# Patient Record
Sex: FEMALE | Race: BLACK OR AFRICAN AMERICAN | Employment: FULL TIME | URBAN - METROPOLITAN AREA
[De-identification: names, ages, dates, MRNs, and addresses within clinical notes are randomized per-mention and may not be internally consistent; named-entity substitution may affect disease eponyms.]

---

## 2018-12-26 ENCOUNTER — OFFICE VISIT (OUTPATIENT)
Dept: FAMILY MEDICINE CLINIC | Age: 38
End: 2018-12-26

## 2018-12-26 VITALS
HEART RATE: 95 BPM | OXYGEN SATURATION: 99 % | BODY MASS INDEX: 37.03 KG/M2 | TEMPERATURE: 98 F | HEIGHT: 63 IN | DIASTOLIC BLOOD PRESSURE: 61 MMHG | WEIGHT: 209 LBS | SYSTOLIC BLOOD PRESSURE: 115 MMHG | RESPIRATION RATE: 20 BRPM

## 2018-12-26 DIAGNOSIS — Z13.220 ENCOUNTER FOR LIPID SCREENING FOR CARDIOVASCULAR DISEASE: ICD-10-CM

## 2018-12-26 DIAGNOSIS — Z11.59 SCREENING FOR VIRAL DISEASE: ICD-10-CM

## 2018-12-26 DIAGNOSIS — Z13.228 SCREENING FOR ENDOCRINE, METABOLIC, AND IMMUNITY DISORDER: ICD-10-CM

## 2018-12-26 DIAGNOSIS — Z11.2 SCREENING EXAMINATION FOR BACTERIAL AND SPIROCHETAL DISEASE: ICD-10-CM

## 2018-12-26 DIAGNOSIS — Z11.4 SCREENING FOR HIV (HUMAN IMMUNODEFICIENCY VIRUS): ICD-10-CM

## 2018-12-26 DIAGNOSIS — Z11.3 SCREEN FOR STD (SEXUALLY TRANSMITTED DISEASE): ICD-10-CM

## 2018-12-26 DIAGNOSIS — Z13.29 SCREENING FOR ENDOCRINE, METABOLIC, AND IMMUNITY DISORDER: ICD-10-CM

## 2018-12-26 DIAGNOSIS — Z13.0 SCREENING FOR ENDOCRINE, METABOLIC, AND IMMUNITY DISORDER: ICD-10-CM

## 2018-12-26 DIAGNOSIS — Z00.00 ROUTINE GENERAL MEDICAL EXAMINATION AT A HEALTH CARE FACILITY: Primary | ICD-10-CM

## 2018-12-26 DIAGNOSIS — Z11.8 SCREENING EXAMINATION FOR BACTERIAL AND SPIROCHETAL DISEASE: ICD-10-CM

## 2018-12-26 DIAGNOSIS — R11.2 NAUSEA AND VOMITING, INTRACTABILITY OF VOMITING NOT SPECIFIED, UNSPECIFIED VOMITING TYPE: ICD-10-CM

## 2018-12-26 DIAGNOSIS — Z30.011 OCP (ORAL CONTRACEPTIVE PILLS) INITIATION: ICD-10-CM

## 2018-12-26 DIAGNOSIS — N94.6 DYSMENORRHEA: ICD-10-CM

## 2018-12-26 DIAGNOSIS — Z13.6 ENCOUNTER FOR LIPID SCREENING FOR CARDIOVASCULAR DISEASE: ICD-10-CM

## 2018-12-26 DIAGNOSIS — Z01.818 TUBAL LIGATION EVALUATION: ICD-10-CM

## 2018-12-26 DIAGNOSIS — Z13.1 SCREENING FOR DIABETES MELLITUS: ICD-10-CM

## 2018-12-26 LAB
HCG URINE, QL. (POC): NEGATIVE
VALID INTERNAL CONTROL?: YES

## 2018-12-26 NOTE — PROGRESS NOTES
OFFICE NOTE    Leeanne Young is a 45 y.o. female presenting today for office visit. 12/26/2018  5:42 PM    Chief Complaint   Patient presents with   [address] Patient    Abdominal Pain     discomfort in abd since last night    Vomiting     x 3       HPI: Here today as a new patient, establishing care in office. No recent labs, no recent PCP. Has seen GYN in the past- Dr Shawnee Blackman- about 2 years ago. Reports that she would like to discuss contraceptive options including tubal ligation, as well as management of menstrual symptoms. She is sexually active with male partner and has two children- youngest is two years old. Previously on Loestrin which worked well. Has painful periods and heavy bleeding including clots. States that since her last child, her periods have gotten worse. Miscarriage last year around this time. Mentions that she had some abdominal discomfort generalized yesterday and through the night and had some vomiting yesterday as well. Feels that maybe it was something she ate over the holidays- no symptoms today other than feeling a little sore in her abdominal muscles. No fevers, chills, urinary symptoms, etc.       Review of Systems   Constitutional: Negative for chills, fatigue and fever. Respiratory: Negative for cough, shortness of breath and wheezing. Cardiovascular: Negative for chest pain, palpitations and leg swelling. Gastrointestinal: Positive for abdominal pain, nausea and vomiting. Negative for constipation and diarrhea. Genitourinary: Positive for menstrual problem. Negative for difficulty urinating, frequency, pelvic pain and urgency. Musculoskeletal: Negative for arthralgias and myalgias. Skin: Negative for rash. Neurological: Negative for dizziness and headaches.          PHQ Screening   PHQ over the last two weeks 12/26/2018   Little interest or pleasure in doing things Not at all   Feeling down, depressed, irritable, or hopeless Not at all   Total Score PHQ 2 0         History  Past Medical History:   Diagnosis Date    GERD (gastroesophageal reflux disease)        Past Surgical History:   Procedure Laterality Date    HX CHOLECYSTECTOMY         Social History     Socioeconomic History    Marital status: SINGLE     Spouse name: Not on file    Number of children: Not on file    Years of education: Not on file    Highest education level: Not on file   Social Needs    Financial resource strain: Not on file    Food insecurity - worry: Not on file    Food insecurity - inability: Not on file   [employer] needs - medical: Not on file   [employer] needs - non-medical: Not on file   Occupational History    Not on file   Tobacco Use    Smoking status: Former Smoker     Types: Cigarettes     Last attempt to quit: 10/2018     Years since quittin.2    Smokeless tobacco: Never Used   Substance and Sexual Activity    Alcohol use: No    Drug use: No    Sexual activity: Yes     Partners: Male     Birth control/protection: Rhythm   Other Topics Concern    Not on file   Social History Narrative    Not on file       Family History   Problem Relation Age of Onset    Hypertension Mother     Hypertension Father     Alcohol abuse Father     Heart Disease Father     Substance Abuse Father     Diabetes Sister     Hypertension Sister     Obesity Sister     Depression Sister     No Known Problems Brother     No Known Problems Brother        No Known Allergies      No current daily medications      Advance Care Planning:   Patient was offered the opportunity to discuss advance care planning NO   Does patient have an Advance Directive: If no, did you provide information on [facility]?          Patient Care Team:  Patient Care Team:  Radha Roth NP as PCP - General (Nurse Practitioner)        LABS:  Results for orders placed or performed in visit on 18   AMB POC URINE PREGNANCY TEST, VISUAL COLOR COMPARISON   Result Value Ref Range    VALID INTERNAL CONTROL POC Yes     HCG urine, Ql. (POC) Negative Negative       RADIOLOGY:  None new to review      Physical Exam   Constitutional: She is oriented to person, place, and time. She appears well-developed and well-nourished. No distress. Neck: Normal range of motion. Neck supple. No thyromegaly present. Cardiovascular: Normal rate, regular rhythm and normal heart sounds. No murmur heard. Pulmonary/Chest: Effort normal and breath sounds normal. No respiratory distress. Abdominal: Soft. Bowel sounds are normal. There is no tenderness. Musculoskeletal: She exhibits no edema. Neurological: She is alert and oriented to person, place, and time. She exhibits normal muscle tone. Coordination and gait normal.   Skin: Skin is warm and dry. Vitals:    12/26/18 1729   BP: 115/61   Pulse: 95   Resp: 20   Temp: 98 °F (36.7 °C)   TempSrc: Oral   SpO2: 99%   Weight: 209 lb (94.8 kg)   Height: 5' 3\" (1.6 m)   PainSc:   2   PainLoc: Abdomen   LMP: 12/10/2018         Assessment and Plan    Routine general medical examination at a health care facility  *CPE completed today. Routine labs ordered. OCP (oral contraceptive pills) initiation/ Dysmenorrhea  *Will start on OCP- reports that she has smoked socially in the past but will not be using with OCP- understands risks associated with age, smoking, and OCP. Advised to start OCP the Sunday after her next cycle. - norethindrone-e estradiol-iron (LOESTRIN FE) 1 mg-20 mcg (24)/75 mg (4) tab; Take 1 Tab by mouth daily. Dispense: 3 Package; Refill: 3  - AMB POC URINE PREGNANCY TEST, VISUAL COLOR COMPARISON    Tubal ligation evaluation  - REFERRAL TO OBSTETRICS AND GYNECOLOGY    Nausea and vomiting, intractability of vomiting not specified, unspecified vomiting type  *Improved today. Advised on hydration and soft diet. RTO if worsening or new symptoms.     Encounter for lipid screening for cardiovascular disease  - LIPID PANEL; Future    Screening for diabetes mellitus  - METABOLIC PANEL, COMPREHENSIVE; Future    Screening for endocrine, metabolic, and immunity disorder  - CBC W/O DIFF; Future  - METABOLIC PANEL, COMPREHENSIVE; Future  - TSH 3RD GENERATION; Future  - URINALYSIS W/MICROSCOPIC; Future    Screening for viral disease/ Screening examination for bacterial and spirochetal disease/ Screening for HIV (human immunodeficiency virus)/ Screen for STD (sexually transmitted disease)  - RPR; Future  - HEPATITIS C AB; Future  - HEP B SURFACE AG; Future  - HIV 1/2 AG/AB, 4TH GENERATION,W RFLX CONFIRM; Future          *Plan of care reviewed with patient. Patient in agreement with plan and expresses understanding. All questions answered and patient encouraged to call or RTO if further questions or concerns. Follow-up Disposition:  Return in about 1 year (around 12/26/2019) for complete physical- 30 mins. Sooner if needed. Jia Stewart

## 2018-12-26 NOTE — PROGRESS NOTES
Chief Complaint   Patient presents with    Establish Care     New Patient    Abdominal Pain     discomfort in abd since last night    Vomiting     x 3       1. Have you been to the ER, urgent care clinic since your last visit? Hospitalized since your last visit? No    2. Have you seen or consulted any other health care providers outside of the [address] [place] [address] since your last visit? Include any pap smears or colon screening.

## 2019-01-02 ENCOUNTER — LAB ONLY (OUTPATIENT)
Dept: FAMILY MEDICINE CLINIC | Age: 39
End: 2019-01-02

## 2019-01-02 NOTE — PROGRESS NOTES
Patient came in for lab draw only. No specimen obtained after two attempts. Patient will hydrate herself and return on Friday for fasting labs.

## 2020-10-06 ENCOUNTER — VIRTUAL VISIT (OUTPATIENT)
Dept: FAMILY MEDICINE CLINIC | Age: 40
End: 2020-10-06

## 2020-10-07 ENCOUNTER — VIRTUAL VISIT (OUTPATIENT)
Dept: FAMILY MEDICINE CLINIC | Age: 40
End: 2020-10-07
Payer: COMMERCIAL

## 2020-10-07 DIAGNOSIS — Z13.228 SCREENING FOR ENDOCRINE, METABOLIC AND IMMUNITY DISORDER: ICD-10-CM

## 2020-10-07 DIAGNOSIS — M54.50 CHRONIC RIGHT-SIDED LOW BACK PAIN WITHOUT SCIATICA: Primary | ICD-10-CM

## 2020-10-07 DIAGNOSIS — G89.29 CHRONIC RIGHT-SIDED LOW BACK PAIN WITHOUT SCIATICA: Primary | ICD-10-CM

## 2020-10-07 DIAGNOSIS — Z13.0 SCREENING FOR ENDOCRINE, METABOLIC AND IMMUNITY DISORDER: ICD-10-CM

## 2020-10-07 DIAGNOSIS — Z13.29 SCREENING FOR ENDOCRINE, METABOLIC AND IMMUNITY DISORDER: ICD-10-CM

## 2020-10-07 DIAGNOSIS — Z76.89 ENCOUNTER TO ESTABLISH CARE WITH NEW DOCTOR: ICD-10-CM

## 2020-10-07 PROCEDURE — 99213 OFFICE O/P EST LOW 20 MIN: CPT | Performed by: NURSE PRACTITIONER

## 2020-10-07 NOTE — PROGRESS NOTES
Srinivas Tierney is a 36 y.o. female who was seen by synchronous (real-time) audio-video technology on 10/7/2020 for No chief complaint on file. Assessment & Plan:   Diagnoses and all orders for this visit:    1. Chronic right-sided low back pain without sciatica    2. Encounter to establish care with new doctor    3. Screening for endocrine, metabolic and immunity disorder  -     LIPID PANEL; Future  -     METABOLIC PANEL, COMPREHENSIVE; Future  -     CBC W/O DIFF; Future  -     TSH 3RD GENERATION; Future  -     HEMOGLOBIN A1C WITH EAG; Future  -     URINALYSIS W/MICROSCOPIC; Future    Advised patient that she needs to get lab work to further work her up and will review on next visit. I spent at least 15 minutes on this visit with this established patient. 712  Subjective:     Patient was previous PCP was Rosalina Rasmussen. She was last seen 12/26/2018. Patient have no recent lab work done    Patient had a IUD placed July 2020 and never went back to follow up to be check for placement. IUD was placed by Justin Ni at OB[facility]N. Patient does not have a concern or complications with at this time. Patient is having normal periods. However patient have not overall been feeling well and unable to really explain the feeling. She ate a piece a steak one day and vomited only that one day. She sometime have nausea and not sure where it comes from but have had GERD in the past. She if symptoms are related to IUD. Back Pain - patient states she intermittently have right lower back pain that happen when she sit for long periods of time or when she lay on her right side within the last 2 months. Pain does not radiate and she does not take anything for the discomfort. Patient states she had gallbladder surgery in 2013. She had GERD and took omeprazole for 2 years. Patient is concerned if her kidneys got infected from taking the omeprazole and causing her back pain.         Prior to Admission medications Medication Sig Start Date End Date Taking? Authorizing Provider   norethindrone-e estradiol-iron (LOESTRIN FE) 1 mg-20 mcg (24)/75 mg (4) tab Take 1 Tab by mouth daily. 12/26/18   Facundo Ngo NP     There is no problem list on file for this patient. No Known Allergies  Family History   Problem Relation Age of Onset    Hypertension Mother     Hypertension Father     Alcohol abuse Father     Heart Disease Father     Substance Abuse Father     Diabetes Sister     Hypertension Sister     Obesity Sister     Depression Sister     No Known Problems Brother     No Known Problems Brother        Review of Systems   Constitutional: Negative. HENT: Negative. Eyes: Negative. Respiratory: Negative. Cardiovascular: Negative for chest pain, palpitations and leg swelling. Gastrointestinal: Positive for nausea. Genitourinary: Negative. Musculoskeletal: Positive for back pain. Skin: Negative. Neurological: Negative. Objective:   No flowsheet data found. General: alert, cooperative, no distress   Mental  status: normal mood, behavior, speech, dress, motor activity, and thought processes, able to follow commands   HENT: NCAT   Neck: no visualized mass   Resp: no respiratory distress   Neuro: no gross deficits   Skin: no discoloration or lesions of concern on visible areas   Psychiatric: normal affect, consistent with stated mood, no evidence of hallucinations           We discussed the expected course, resolution and complications of the diagnosis(es) in detail. Medication risks, benefits, costs, interactions, and alternatives were discussed as indicated. I advised her to contact the office if her condition worsens, changes or fails to improve as anticipated. She expressed understanding with the diagnosis(es) and plan.        Coty Butts, who was evaluated through a patient-initiated, synchronous (real-time) audio-video encounter, and/or her healthcare decision maker, is aware that it is a billable service, with coverage as determined by her insurance carrier. She provided verbal consent to proceed: Yes, and patient identification was verified. It was conducted pursuant to the emergency declaration under the [address] [place] [address] authority and the [employer]. A caregiver was present when appropriate. Ability to conduct physical exam was limited. I was in the office. The patient was at home.       Ginger Adan NP

## 2020-10-07 NOTE — Clinical Note
Please call patient and schedule labs that is ordered and then schedule a virtual visit a week after lab work. Also please give patient fax number to our office to have her GYN send copy of pap done and mammogram that was done in July per patient.

## 2020-10-22 ENCOUNTER — LAB ONLY (OUTPATIENT)
Dept: FAMILY MEDICINE CLINIC | Age: 40
End: 2020-10-22

## 2020-10-22 NOTE — PROGRESS NOTES
Patient was here for labs-attempted x 1 unsuccessful patient to go to [facility] lab for draw